# Patient Record
Sex: FEMALE | Race: WHITE | URBAN - METROPOLITAN AREA
[De-identification: names, ages, dates, MRNs, and addresses within clinical notes are randomized per-mention and may not be internally consistent; named-entity substitution may affect disease eponyms.]

---

## 2024-10-24 ENCOUNTER — E-VISIT (OUTPATIENT)
Dept: TELEHEALTH | Age: 64
End: 2024-10-24
Payer: COMMERCIAL

## 2024-10-24 DIAGNOSIS — Z76.0 ENCOUNTER FOR MEDICATION REFILL: Primary | ICD-10-CM

## 2024-10-25 RX ORDER — BUDESONIDE AND FORMOTEROL FUMARATE DIHYDRATE 160; 4.5 UG/1; UG/1
2 AEROSOL RESPIRATORY (INHALATION) 2 TIMES DAILY
Qty: 10.2 G | Refills: 0 | OUTPATIENT
Start: 2024-10-25

## 2024-10-25 NOTE — TELEPHONE ENCOUNTER
Patient informed we cannot refill Symbicort without an appointment.  Patient has Covid now.  Will make a tele health appointment for Thursday.  Patient verbalized understanding.  Refill Denied.

## 2024-10-25 NOTE — PROGRESS NOTES
Sienna Walker is a 64 year old female.  HPI:   See answers to questions above.     Current Outpatient Medications   Medication Sig Dispense Refill    nirmatrelvir-ritonavir 300-100 MG Oral Tablet Therapy Pack Take 3 tablets by mouth 2 (two) times daily.        No past medical history on file.   No past surgical history on file.   No family history on file.   Social History:  Social History     Socioeconomic History    Marital status:          ASSESSMENT AND PLAN:     Encounter Diagnosis   Name Primary?    Encounter for medication refill Yes     Pt states she will wait to hear from Dr. Camargo, immunology and allergy specialist for refill on budesonide solution.       Meds & Refills for this Visit:  Requested Prescriptions      No prescriptions requested or ordered in this encounter       Duration of  the service:  3 minutes

## 2024-10-29 RX ORDER — LEVALBUTEROL TARTRATE 45 UG/1
2 AEROSOL, METERED ORAL
COMMUNITY

## 2024-10-29 RX ORDER — PRAVASTATIN SODIUM 40 MG
40 TABLET ORAL NIGHTLY
COMMUNITY

## 2024-10-29 RX ORDER — ALPRAZOLAM 0.5 MG
0.5 TABLET ORAL NIGHTLY PRN
COMMUNITY

## 2024-10-29 RX ORDER — SERTRALINE HYDROCHLORIDE 100 MG/1
100 TABLET, FILM COATED ORAL DAILY
COMMUNITY

## 2024-10-29 RX ORDER — CELECOXIB 200 MG/1
200 CAPSULE ORAL DAILY
COMMUNITY

## 2024-10-29 RX ORDER — VERAPAMIL HYDROCHLORIDE 180 MG/1
180 TABLET, EXTENDED RELEASE ORAL NIGHTLY
COMMUNITY

## 2024-10-29 RX ORDER — IPRATROPIUM BROMIDE AND ALBUTEROL SULFATE 2.5; .5 MG/3ML; MG/3ML
3 SOLUTION RESPIRATORY (INHALATION) EVERY 4 HOURS PRN
COMMUNITY

## 2024-10-29 RX ORDER — BUTALBITAL, ACETAMINOPHEN AND CAFFEINE 50; 325; 40 MG/1; MG/1; MG/1
1 TABLET ORAL EVERY 4 HOURS PRN
COMMUNITY

## 2024-10-29 RX ORDER — MONTELUKAST SODIUM 10 MG/1
10 TABLET ORAL NIGHTLY
COMMUNITY

## 2024-10-29 RX ORDER — CODEINE PHOSPHATE AND GUAIFENESIN 10; 100 MG/5ML; MG/5ML
5 SOLUTION ORAL EVERY 6 HOURS PRN
COMMUNITY

## 2024-10-29 RX ORDER — LANSOPRAZOLE 30 MG/1
30 CAPSULE, DELAYED RELEASE ORAL
COMMUNITY

## 2024-10-29 RX ORDER — BUDESONIDE AND FORMOTEROL FUMARATE DIHYDRATE 160; 4.5 UG/1; UG/1
2 AEROSOL RESPIRATORY (INHALATION) 2 TIMES DAILY
COMMUNITY

## 2024-10-31 ENCOUNTER — TELEMEDICINE (OUTPATIENT)
Age: 64
End: 2024-10-31

## 2024-10-31 DIAGNOSIS — I50.32 CHRONIC DIASTOLIC CONGESTIVE HEART FAILURE (HCC): ICD-10-CM

## 2024-10-31 DIAGNOSIS — J30.89 NON-SEASONAL ALLERGIC RHINITIS, UNSPECIFIED TRIGGER: ICD-10-CM

## 2024-10-31 DIAGNOSIS — J45.40 MODERATE PERSISTENT ASTHMA, UNSPECIFIED WHETHER COMPLICATED (HCC): Primary | ICD-10-CM

## 2024-10-31 DIAGNOSIS — U07.1 COVID-19 VIRUS DETECTED: ICD-10-CM

## 2024-10-31 DIAGNOSIS — R05.3 CHRONIC COUGH: ICD-10-CM

## 2024-10-31 DIAGNOSIS — I34.0 MITRAL VALVE INSUFFICIENCY, UNSPECIFIED ETIOLOGY: ICD-10-CM

## 2024-10-31 DIAGNOSIS — R76.8 ELEVATED IGE LEVEL: ICD-10-CM

## 2024-10-31 DIAGNOSIS — R06.09 DYSPNEA ON EXERTION: ICD-10-CM

## 2024-10-31 RX ORDER — LEVALBUTEROL TARTRATE 45 UG/1
2 AEROSOL, METERED ORAL EVERY 4 HOURS PRN
Qty: 1 EACH | Refills: 5 | Status: SHIPPED | OUTPATIENT
Start: 2024-10-31

## 2024-10-31 RX ORDER — OMALIZUMAB 150 MG/ML
150 INJECTION, SOLUTION SUBCUTANEOUS EVERY 2 WEEKS
Qty: 2 ML | Refills: 11 | Status: SHIPPED | OUTPATIENT
Start: 2024-10-31 | End: 2024-10-31

## 2024-10-31 RX ORDER — BUDESONIDE AND FORMOTEROL FUMARATE DIHYDRATE 160; 4.5 UG/1; UG/1
2 AEROSOL RESPIRATORY (INHALATION) 2 TIMES DAILY
Qty: 3 EACH | Refills: 3 | Status: SHIPPED | OUTPATIENT
Start: 2024-10-31

## 2024-10-31 RX ORDER — OMALIZUMAB 75 MG/.5ML
75 INJECTION, SOLUTION SUBCUTANEOUS EVERY 2 WEEKS
Qty: 1 ML | Refills: 11 | Status: SHIPPED | OUTPATIENT
Start: 2024-10-31

## 2024-10-31 RX ORDER — OMALIZUMAB 75 MG/.5ML
75 INJECTION, SOLUTION SUBCUTANEOUS EVERY 2 WEEKS
Qty: 1 ML | Refills: 11 | Status: SHIPPED | OUTPATIENT
Start: 2024-10-31 | End: 2024-10-31

## 2024-10-31 RX ORDER — MONTELUKAST SODIUM 10 MG/1
10 TABLET ORAL EVERY EVENING
Qty: 90 TABLET | Refills: 3 | Status: SHIPPED | OUTPATIENT
Start: 2024-10-31

## 2024-10-31 RX ORDER — OMALIZUMAB 150 MG/ML
150 INJECTION, SOLUTION SUBCUTANEOUS EVERY 2 WEEKS
Qty: 2 ML | Refills: 11 | Status: SHIPPED | OUTPATIENT
Start: 2024-10-31

## 2024-10-31 NOTE — PROGRESS NOTES
Sienna Walker is a 64 year old female.    HPI:   No chief complaint on file.    Patient is a 63 y/o female for lengthy, complex follow up.  Patient is receiving a biologic injection therapy at a remote site.    Patient has a long history of uncontrolled moderate asthma, previously requiring frequent ER visits / hospitalizations / oral steroids.    Since starting Xolair biologic injection therapy, patient's asthma has improved significantly.    Patient now self administers Xolair biologic injection therapy , 225mg sub Q every 2 weeks at home without difficulty / side effects.    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)      Past Surgical History:   Procedure Laterality Date    Other surgical history Left     2015      Family History   Problem Relation Age of Onset    Other (MI) Father 36    Hypertension Father     Other (lung cancer) Father     Other (CVA) Mother     Diabetes Mother     Hypertension Mother     Other (MI) Mother     COPD Mother     Other (pericarditis) Daughter 38        03-    Other (coronary heart disease) Daughter     Other (MI) Brother 42        passed away at 42      Social History:   Social History     Socioeconomic History    Marital status:    Tobacco Use    Smoking status: Never     Passive exposure: Past    Smokeless tobacco: Never   Substance and Sexual Activity    Alcohol use: Not Currently        Medications (Active prior to today's  visit):  Current Outpatient Medications   Medication Sig Dispense Refill    guaiFENesin-codeine 100-10 MG/5ML Oral Solution Take 5 mL by mouth every 6 (six) hours as needed for cough.      montelukast 10 MG Oral Tab Take 1 tablet (10 mg total) by mouth nightly.      Budesonide-Formoterol Fumarate 160-4.5 MCG/ACT Inhalation Aerosol Inhale 2 puffs into the lungs 2 (two) times daily. With spacer      rivaroxaban (XARELTO) 20 MG Oral Tab Take 1 tablet (20 mg total) by mouth daily.      Levalbuterol Tartrate 45 MCG/ACT Inhalation Aerosol Inhale 2 puffs into the lungs every 4 to 6 hours as needed for Wheezing.      ipratropium-albuterol 0.5-2.5 (3) MG/3ML Inhalation Solution Take 3 mL by nebulization every 4 (four) hours as needed.      butalbital-acetaminophen-caffeine -40 MG Oral Tab Take 1 tablet by mouth every 4 (four) hours as needed for Pain.      ALPRAZolam 0.5 MG Oral Tab Take 1 tablet (0.5 mg total) by mouth nightly as needed.      sertraline 50 MG Oral Tab Take 1 tablet (50 mg total) by mouth daily.      sertraline 100 MG Oral Tab Take 1 tablet (100 mg total) by mouth daily.      lansoprazole 30 MG Oral Capsule Delayed Release Take 1 capsule (30 mg total) by mouth every morning before breakfast.      pravastatin 40 MG Oral Tab Take 1 tablet (40 mg total) by mouth nightly.      verapamil  MG Oral Tab CR Take 1 tablet (180 mg total) by mouth nightly.      celecoxib 200 MG Oral Cap Take 1 capsule (200 mg total) by mouth daily.         Allergies:  Allergies[1]      ROS:   Review of Systems   Constitutional:  Negative for activity change, appetite change, chills, diaphoresis, fatigue, fever and unexpected weight change.   HENT:  Negative for congestion, ear discharge, ear pain, facial swelling, hearing loss, mouth sores, postnasal drip, rhinorrhea, sinus pressure, sinus pain, sneezing, sore throat, tinnitus, trouble swallowing and voice change.    Eyes:  Negative for pain, discharge, redness and itching.    Respiratory:  Negative for apnea, cough, choking, chest tightness, shortness of breath, wheezing and stridor.    Cardiovascular:  Negative for chest pain and palpitations.   Gastrointestinal:  Negative for abdominal distention, abdominal pain, constipation, diarrhea, nausea and vomiting.   Endocrine: Negative for cold intolerance and heat intolerance.   Musculoskeletal:  Negative for arthralgias, joint swelling and myalgias.   Skin:  Negative for pallor and rash.   Allergic/Immunologic: Negative for environmental allergies, food allergies and immunocompromised state.   Neurological:  Negative for headaches.   Psychiatric/Behavioral:  Negative for behavioral problems and sleep disturbance.           PHYSICAL EXAM:   Physical Exam  Constitutional:       General: She is not in acute distress.     Appearance: Normal appearance. She is normal weight. She is not ill-appearing.   HENT:      Head: Normocephalic and atraumatic.   Pulmonary:      Effort: Pulmonary effort is normal. No respiratory distress.   Musculoskeletal:         General: No signs of injury. Normal range of motion.   Skin:     Findings: No rash.   Neurological:      Mental Status: She is alert.   Psychiatric:         Mood and Affect: Mood normal.         Behavior: Behavior normal.         Thought Content: Thought content normal.           ASSESSMENT   Assessment   Encounter Diagnoses   Name Primary?    Moderate persistent asthma, unspecified whether complicated (HCC) Yes    Elevated IgE level     Non-seasonal allergic rhinitis, unspecified trigger     Chronic cough     Mitral valve insufficiency, unspecified etiology     Chronic diastolic congestive heart failure (HCC)     Dyspnea on exertion        PLAN     Patient will continue to use her moderate asthma controller medications, including Symbicort 160/4.5: 2 p bid, plus Montelukast at bedtime, plus Xopenex : 2 p Q4h prn.    Patient will continue to self administer Xolair , 225mg subQ every 2 weeks at  home    Follow up in 6 months.       Orders This Visit:  No orders of the defined types were placed in this encounter.      Meds This Visit:  Requested Prescriptions      No prescriptions requested or ordered in this encounter       Imaging & Referrals:  None     10/31/2024  Dat Camargo MD      If medication samples were provided today, they were provided solely for patient education and training related to self administration of these medications.  Teaching, instruction and sample was provided to the patient by myself.  Teaching included  a review of potential adverse side effects as well as potential efficacy.  Patient's questions were answered in regards to medication administration and dosing and potential side effects. Teaching was provided via the teach back method       [1]   Allergies  Allergen Reactions    Albuterol PALPITATIONS    Sumatriptan PALPITATIONS     photosensitivity    Amitriptyline OTHER (SEE COMMENTS)     Unable to recall    Breo Ellipta UNKNOWN    Fentanyl OTHER (SEE COMMENTS) and UNKNOWN     Migraine, sweats    Prochlorperazine OTHER (SEE COMMENTS)     tremors    Vancomycin UNKNOWN    Latex RASH

## 2024-10-31 NOTE — PATIENT INSTRUCTIONS
Patient will continue to use her asthma controller medication, including Symbicort 160/4.5: 2 puffs morning and night, plus Montelukast at bedtime, plus Xopenex: 2 puffs every 4 hours as needed.    Patient will continue to self administer Xolair 225 mg subQ every 2 weeks at home.    Patient will follow up at our [facility] in [place], [place] in 6 months

## 2025-01-28 ENCOUNTER — TELEPHONE (OUTPATIENT)
Dept: ALLERGY | Facility: CLINIC | Age: 65
End: 2025-01-28

## 2025-01-28 NOTE — TELEPHONE ENCOUNTER
Fax received from [facility] reporting that [facility] has been unable to reach patient to fill her Xolair 75 mg/0.5 mL and Xolair 150 mg/mL prefilled syringes.

## 2025-01-29 NOTE — TELEPHONE ENCOUNTER
Patient informed we received a notification that  pharmacy has been trying to reach out to her re: her Xolair shipment. Patient states she will call them this afternoon.

## 2025-03-10 ENCOUNTER — TELEPHONE (OUTPATIENT)
Dept: ALLERGY | Facility: CLINIC | Age: 65
End: 2025-03-10

## 2025-03-10 NOTE — TELEPHONE ENCOUNTER
Spoke with patient. Verified name and date of birth. Informed patient our office received a fax from [facility] stating they have been trying to reach out to her to set up a Xolair delivery. Patient verbalizes understanding and states will contact the pharmacy.

## 2025-05-09 ENCOUNTER — TELEPHONE (OUTPATIENT)
Dept: ALLERGY | Facility: CLINIC | Age: 65
End: 2025-05-09

## 2025-05-16 NOTE — TELEPHONE ENCOUNTER
Prior authorization obtained from [employer] for Xolair 150 mg pre filled syringe.  PA # [id number] valid from 04/16/25-05/16/26  Xolair 75 mg pre-filled syringe -PA# [id number] Valid from 04/16/25-04/16/2026.  Call made to [facility] approval information given and refills authorized for Xolair 75 mg and 150 mg # 2 each with 11 refills. Patient needs to take 225 mg every 2 weeks.. Patient notified and number given for [facility].

## 2025-05-16 NOTE — TELEPHONE ENCOUNTER
Attepted prior authorization for Xolair for [employer].  Patient was not found.  Call made to [facility] @ [phone]. PA form completed and faxed back to [phone]

## 2025-05-22 ENCOUNTER — MED REC SCAN ONLY (OUTPATIENT)
Facility: CLINIC | Age: 65
End: 2025-05-22

## 2025-07-14 DIAGNOSIS — R06.09 DYSPNEA ON EXERTION: ICD-10-CM

## 2025-07-14 DIAGNOSIS — J45.40 MODERATE PERSISTENT ASTHMA, UNSPECIFIED WHETHER COMPLICATED (HCC): ICD-10-CM

## 2025-07-14 DIAGNOSIS — R05.3 CHRONIC COUGH: ICD-10-CM

## 2025-07-14 RX ORDER — MONTELUKAST SODIUM 10 MG/1
10 TABLET ORAL EVERY EVENING
Qty: 90 TABLET | Refills: 3 | Status: CANCELLED | OUTPATIENT
Start: 2025-07-14

## 2025-07-14 NOTE — TELEPHONE ENCOUNTER
Refill requested for    montelukast 10 MG Oral Tab         Sig: Take 1 tablet (10 mg total) by mouth every evening.    Disp: 90 tablet    Refills: 3    Start: 7/14/2025    Class: Normal    Non-formulary For: Moderate persistent asthma, unspecified whether complicated (HCC); Chronic cough; Dyspnea on exertion    Last ordered: 8 months ago (10/31/2024) by Dat Camargo MD    Patient comment: Prescription has been on refill for 7 days now.  Supposedly it needs doctors approval even though it has 2 refills available. SSees to be an issue with the Pharmacy not contacting doctors.  Same with my other doctors.    Corticosteroids / Long-Acting Bronchodilators Failed 07/14/2025 11:01 AM   Protocol Details Appt in past 6 mos or next 3 mos    Medication is active on med list      To be filled at: [id number] - [place] [address] AT [facility], [phone], [phone]       Last office visit: 10/31/2024    Previously advised to follow up in Follow up in 6 months.     F/U currently scheduled? Yes 9/9/25      ACTION: Refilled per protocol.   Spoke to pharmacy. They report that she has two refills on hold.   Pharmacy reports the Rx went through. She is unsure why it was on hold, but they will get the prescription ready for her.